# Patient Record
Sex: FEMALE | Race: OTHER | HISPANIC OR LATINO
[De-identification: names, ages, dates, MRNs, and addresses within clinical notes are randomized per-mention and may not be internally consistent; named-entity substitution may affect disease eponyms.]

---

## 2021-11-09 ENCOUNTER — TRANSCRIPTION ENCOUNTER (OUTPATIENT)
Age: 21
End: 2021-11-09

## 2021-11-09 ENCOUNTER — OUTPATIENT (OUTPATIENT)
Dept: OUTPATIENT SERVICES | Facility: HOSPITAL | Age: 21
LOS: 1 days | End: 2021-11-09
Payer: COMMERCIAL

## 2021-11-09 VITALS
WEIGHT: 169.09 LBS | DIASTOLIC BLOOD PRESSURE: 78 MMHG | TEMPERATURE: 98 F | HEART RATE: 88 BPM | HEIGHT: 63 IN | OXYGEN SATURATION: 100 % | RESPIRATION RATE: 16 BRPM | SYSTOLIC BLOOD PRESSURE: 116 MMHG

## 2021-11-09 DIAGNOSIS — Z98.890 OTHER SPECIFIED POSTPROCEDURAL STATES: Chronic | ICD-10-CM

## 2021-11-09 DIAGNOSIS — O02.1 MISSED ABORTION: ICD-10-CM

## 2021-11-09 DIAGNOSIS — Z01.818 ENCOUNTER FOR OTHER PREPROCEDURAL EXAMINATION: ICD-10-CM

## 2021-11-09 DIAGNOSIS — O00.91 UNSPECIFIED ECTOPIC PREGNANCY WITH INTRAUTERINE PREGNANCY: ICD-10-CM

## 2021-11-09 LAB
HCG SERPL-ACNC: 5285 MIU/ML — HIGH
HCT VFR BLD CALC: 37.6 % — SIGNIFICANT CHANGE UP (ref 34.5–45)
HGB BLD-MCNC: 11.9 G/DL — SIGNIFICANT CHANGE UP (ref 11.5–15.5)
MCHC RBC-ENTMCNC: 27.4 PG — SIGNIFICANT CHANGE UP (ref 27–34)
MCHC RBC-ENTMCNC: 31.6 GM/DL — LOW (ref 32–36)
MCV RBC AUTO: 86.4 FL — SIGNIFICANT CHANGE UP (ref 80–100)
NRBC # BLD: 0 /100 WBCS — SIGNIFICANT CHANGE UP (ref 0–0)
PLATELET # BLD AUTO: 272 K/UL — SIGNIFICANT CHANGE UP (ref 150–400)
RBC # BLD: 4.35 M/UL — SIGNIFICANT CHANGE UP (ref 3.8–5.2)
RBC # FLD: 12.8 % — SIGNIFICANT CHANGE UP (ref 10.3–14.5)
SARS-COV-2 RNA SPEC QL NAA+PROBE: SIGNIFICANT CHANGE UP
WBC # BLD: 9.4 K/UL — SIGNIFICANT CHANGE UP (ref 3.8–10.5)
WBC # FLD AUTO: 9.4 K/UL — SIGNIFICANT CHANGE UP (ref 3.8–10.5)

## 2021-11-09 PROCEDURE — 36415 COLL VENOUS BLD VENIPUNCTURE: CPT

## 2021-11-09 PROCEDURE — 86850 RBC ANTIBODY SCREEN: CPT

## 2021-11-09 PROCEDURE — 86900 BLOOD TYPING SEROLOGIC ABO: CPT

## 2021-11-09 PROCEDURE — 84702 CHORIONIC GONADOTROPIN TEST: CPT

## 2021-11-09 PROCEDURE — 85027 COMPLETE CBC AUTOMATED: CPT

## 2021-11-09 PROCEDURE — 86901 BLOOD TYPING SEROLOGIC RH(D): CPT

## 2021-11-09 PROCEDURE — U0003: CPT

## 2021-11-09 PROCEDURE — G0463: CPT

## 2021-11-09 PROCEDURE — U0005: CPT

## 2021-11-09 NOTE — H&P PST ADULT - PROBLEM SELECTOR PLAN 1
scheduled for a D&C hysteroscopy - miss - F.S - possible diagnostic laparoscopy on 11/10 with Dr. Fernando

## 2021-11-09 NOTE — H&P PST ADULT - GENERAL COMMENTS
Had 1 vaccine dose; Denies recent international travel, recent illness and sick contact with COVID in the last 3 weeks

## 2021-11-09 NOTE — H&P PST ADULT - HISTORY OF PRESENT ILLNESS
22 yo female presents to PST scheduled for a D&C hysteroscopy - miss - F.S - possible diagnostic laparoscopy on 11/10 with Dr. Fernando.  2021. Going for weekly ultrasounds to check for progression of pregnancy, no embryo was noted in the uterus, on the last ultrasound  Reports that she had cramping yesterday. Denies bleeding.  20 yo  female presents to PST scheduled for a D&C hysteroscopy - miss - F.S - possible diagnostic laparoscopy on 11/10 with Dr. Fernando.  2021. Going for weekly ultrasounds to check for progression of pregnancy, no embryo was noted in the uterus, on the last ultrasound  Reports that she had cramping yesterday. Denies bleeding.

## 2021-11-09 NOTE — H&P PST ADULT - PROBLEM SELECTOR PLAN 2
Labs - CBC, HCG , T&S and COVID PCR pending.   No MC needed  Pre op and Hibiclens instructions reviewed and given to use today and tomorrow morning. Instructed to hold and/or avoid other NSAIDs and OTC supplements. Tylenol is ok. Verbalized understanding

## 2021-11-09 NOTE — H&P PST ADULT - NSICDXFAMILYHX_GEN_ALL_CORE_FT
FAMILY HISTORY:  Mother  Still living? Yes, Estimated age: Age Unknown  FH: HTN (hypertension), Age at diagnosis: Age Unknown

## 2021-11-10 ENCOUNTER — OUTPATIENT (OUTPATIENT)
Dept: OUTPATIENT SERVICES | Facility: HOSPITAL | Age: 21
LOS: 1 days | End: 2021-11-10
Payer: COMMERCIAL

## 2021-11-10 ENCOUNTER — RESULT REVIEW (OUTPATIENT)
Age: 21
End: 2021-11-10

## 2021-11-10 VITALS
HEIGHT: 63 IN | OXYGEN SATURATION: 99 % | TEMPERATURE: 98 F | WEIGHT: 169.09 LBS | RESPIRATION RATE: 18 BRPM | SYSTOLIC BLOOD PRESSURE: 127 MMHG | HEART RATE: 112 BPM | DIASTOLIC BLOOD PRESSURE: 67 MMHG

## 2021-11-10 VITALS
HEART RATE: 101 BPM | OXYGEN SATURATION: 98 % | RESPIRATION RATE: 16 BRPM | DIASTOLIC BLOOD PRESSURE: 69 MMHG | SYSTOLIC BLOOD PRESSURE: 111 MMHG

## 2021-11-10 DIAGNOSIS — O02.1 MISSED ABORTION: ICD-10-CM

## 2021-11-10 DIAGNOSIS — O00.91 UNSPECIFIED ECTOPIC PREGNANCY WITH INTRAUTERINE PREGNANCY: ICD-10-CM

## 2021-11-10 DIAGNOSIS — Z98.890 OTHER SPECIFIED POSTPROCEDURAL STATES: Chronic | ICD-10-CM

## 2021-11-10 PROCEDURE — 88331 PATH CONSLTJ SURG 1 BLK 1SPC: CPT

## 2021-11-10 PROCEDURE — 88331 PATH CONSLTJ SURG 1 BLK 1SPC: CPT | Mod: 26

## 2021-11-10 PROCEDURE — 88305 TISSUE EXAM BY PATHOLOGIST: CPT

## 2021-11-10 PROCEDURE — 88305 TISSUE EXAM BY PATHOLOGIST: CPT | Mod: 26

## 2021-11-10 PROCEDURE — 59812 TREATMENT OF MISCARRIAGE: CPT

## 2021-11-10 RX ORDER — SODIUM CHLORIDE 9 MG/ML
1000 INJECTION, SOLUTION INTRAVENOUS
Refills: 0 | Status: DISCONTINUED | OUTPATIENT
Start: 2021-11-10 | End: 2021-11-10

## 2021-11-10 RX ORDER — OXYCODONE HYDROCHLORIDE 5 MG/1
5 TABLET ORAL ONCE
Refills: 0 | Status: DISCONTINUED | OUTPATIENT
Start: 2021-11-10 | End: 2021-11-10

## 2021-11-10 RX ORDER — ONDANSETRON 8 MG/1
4 TABLET, FILM COATED ORAL ONCE
Refills: 0 | Status: DISCONTINUED | OUTPATIENT
Start: 2021-11-10 | End: 2021-11-10

## 2021-11-10 RX ORDER — HYDROMORPHONE HYDROCHLORIDE 2 MG/ML
0.5 INJECTION INTRAMUSCULAR; INTRAVENOUS; SUBCUTANEOUS
Refills: 0 | Status: DISCONTINUED | OUTPATIENT
Start: 2021-11-10 | End: 2021-11-10

## 2021-11-10 RX ADMIN — SODIUM CHLORIDE 75 MILLILITER(S): 9 INJECTION, SOLUTION INTRAVENOUS at 19:47

## 2021-11-10 NOTE — ASU DISCHARGE PLAN (ADULT/PEDIATRIC) - CARE PROVIDER_API CALL
Queta Vargas)  Obstetrics and Gynecology  [address] [place] [address]  Phone: [phone]  Fax: [phone]  Follow Up Time:

## 2021-11-10 NOTE — BRIEF OPERATIVE NOTE - OPERATION/FINDINGS
Normal external genitalia, normal appearing cervix, os closed, moderate tissue obtained with suction and sharp curettage, methergine x1 given  EBL 10cc

## 2021-11-10 NOTE — BRIEF OPERATIVE NOTE - NSICDXBRIEFPROCEDURE_GEN_ALL_CORE_FT
PROCEDURES:  Dilation and curettage, uterus, after spontaneous  10-Nov-2021 18:54:22  Queta Fernando

## 2022-10-05 PROBLEM — Z78.9 OTHER SPECIFIED HEALTH STATUS: Chronic | Status: ACTIVE | Noted: 2021-11-09

## 2022-10-20 NOTE — H&P PST ADULT - NSANTHOSAYNRD_GEN_A_CORE
Chart(s)/Patient/Other Family Member No. CHERELLE screening performed.  STOP BANG Legend: 0-2 = LOW Risk; 3-4 = INTERMEDIATE Risk; 5-8 = HIGH Risk

## 2022-11-04 ENCOUNTER — APPOINTMENT (OUTPATIENT)
Dept: ANTEPARTUM | Facility: CLINIC | Age: 22
End: 2022-11-04

## 2022-11-04 ENCOUNTER — ASOB RESULT (OUTPATIENT)
Age: 22
End: 2022-11-04

## 2022-11-04 PROCEDURE — 76813 OB US NUCHAL MEAS 1 GEST: CPT

## 2022-11-04 PROCEDURE — 76801 OB US < 14 WKS SINGLE FETUS: CPT | Mod: 59

## 2022-12-30 ENCOUNTER — APPOINTMENT (OUTPATIENT)
Dept: ANTEPARTUM | Facility: CLINIC | Age: 22
End: 2022-12-30
Payer: COMMERCIAL

## 2022-12-30 ENCOUNTER — TRANSCRIPTION ENCOUNTER (OUTPATIENT)
Age: 22
End: 2022-12-30

## 2022-12-30 ENCOUNTER — ASOB RESULT (OUTPATIENT)
Age: 22
End: 2022-12-30

## 2022-12-30 PROCEDURE — 99212 OFFICE O/P EST SF 10 MIN: CPT | Mod: 25

## 2022-12-30 PROCEDURE — 76811 OB US DETAILED SNGL FETUS: CPT

## 2023-01-12 ENCOUNTER — ASOB RESULT (OUTPATIENT)
Age: 23
End: 2023-01-12

## 2023-01-12 ENCOUNTER — APPOINTMENT (OUTPATIENT)
Dept: ANTEPARTUM | Facility: CLINIC | Age: 23
End: 2023-01-12
Payer: COMMERCIAL

## 2023-01-12 PROCEDURE — 76816 OB US FOLLOW-UP PER FETUS: CPT

## 2023-02-17 ENCOUNTER — ASOB RESULT (OUTPATIENT)
Age: 23
End: 2023-02-17

## 2023-02-17 ENCOUNTER — APPOINTMENT (OUTPATIENT)
Dept: ANTEPARTUM | Facility: CLINIC | Age: 23
End: 2023-02-17
Payer: COMMERCIAL

## 2023-02-17 PROCEDURE — 76816 OB US FOLLOW-UP PER FETUS: CPT

## 2023-04-04 ENCOUNTER — OUTPATIENT (OUTPATIENT)
Dept: INPATIENT UNIT | Facility: HOSPITAL | Age: 23
LOS: 1 days | Discharge: HOME | End: 2023-04-04
Payer: MEDICAID

## 2023-04-04 VITALS
TEMPERATURE: 99 F | DIASTOLIC BLOOD PRESSURE: 58 MMHG | RESPIRATION RATE: 16 BRPM | HEART RATE: 89 BPM | SYSTOLIC BLOOD PRESSURE: 132 MMHG

## 2023-04-04 DIAGNOSIS — O26.899 OTHER SPECIFIED PREGNANCY RELATED CONDITIONS, UNSPECIFIED TRIMESTER: ICD-10-CM

## 2023-04-04 DIAGNOSIS — Z98.890 OTHER SPECIFIED POSTPROCEDURAL STATES: Chronic | ICD-10-CM

## 2023-04-04 LAB
APPEARANCE UR: ABNORMAL
BACTERIA # UR AUTO: ABNORMAL
BILIRUB UR-MCNC: NEGATIVE — SIGNIFICANT CHANGE UP
COLOR SPEC: SIGNIFICANT CHANGE UP
DIFF PNL FLD: ABNORMAL
EPI CELLS # UR: 6 /HPF — HIGH (ref 0–5)
GLUCOSE UR QL: NEGATIVE — SIGNIFICANT CHANGE UP
HYALINE CASTS # UR AUTO: 5 /LPF — SIGNIFICANT CHANGE UP (ref 0–7)
KETONES UR-MCNC: ABNORMAL
LEUKOCYTE ESTERASE UR-ACNC: ABNORMAL
NITRITE UR-MCNC: NEGATIVE — SIGNIFICANT CHANGE UP
PH UR: 6.5 — SIGNIFICANT CHANGE UP (ref 5–8)
PROT UR-MCNC: NEGATIVE — SIGNIFICANT CHANGE UP
RBC CASTS # UR COMP ASSIST: 2 /HPF — SIGNIFICANT CHANGE UP (ref 0–4)
SP GR SPEC: 1 — LOW (ref 1.01–1.05)
UROBILINOGEN FLD QL: SIGNIFICANT CHANGE UP
WBC UR QL: 23 /HPF — HIGH (ref 0–5)

## 2023-04-04 PROCEDURE — 99221 1ST HOSP IP/OBS SF/LOW 40: CPT

## 2023-04-04 RX ORDER — SODIUM CHLORIDE 9 MG/ML
1000 INJECTION, SOLUTION INTRAVENOUS ONCE
Refills: 0 | Status: COMPLETED | OUTPATIENT
Start: 2023-04-04 | End: 2023-04-04

## 2023-04-04 RX ORDER — CEPHALEXIN 500 MG
1 CAPSULE ORAL
Qty: 28 | Refills: 0
Start: 2023-04-04 | End: 2023-04-10

## 2023-04-04 RX ORDER — ACETAMINOPHEN 500 MG
1000 TABLET ORAL ONCE
Refills: 0 | Status: COMPLETED | OUTPATIENT
Start: 2023-04-04 | End: 2023-04-04

## 2023-04-04 RX ADMIN — Medication 1000 MILLIGRAM(S): at 22:25

## 2023-04-04 RX ADMIN — Medication 1000 MILLIGRAM(S): at 22:30

## 2023-04-04 RX ADMIN — SODIUM CHLORIDE 1000 MILLILITER(S): 9 INJECTION, SOLUTION INTRAVENOUS at 23:18

## 2023-04-04 NOTE — OB PROVIDER TRIAGE NOTE - NSHPLABSRESULTS_GEN_ALL_CORE
Urinalysis Basic - ( 2023 22:25 )    Color: Light Yellow / Appearance: Slightly Turbid / S.005 / pH: x  Gluc: x / Ketone: Trace  / Bili: Negative / Urobili: <2 mg/dL   Blood: x / Protein: Negative / Nitrite: Negative   Leuk Esterase: Large / RBC: 2 /HPF / WBC 23 /HPF   Sq Epi: x / Non Sq Epi: 6 /HPF / Bacteria: Few

## 2023-04-04 NOTE — OB PROVIDER TRIAGE NOTE - PLAN OF CARE
22 y/o , EDC , [place] 35 weeks, evidence of UTI.  - Keflex 500 Q6hrs for 7 days sent to pharmacy, urine culture ordered    23:00-pt reports pain did not improve with tylenol, 1 L LR bolus ordered, for recheck at 00:15     00:20- pt states pain improved after bolus, repeat VE unchanged    - Patient to be discharged home with follow up and return precautions  - Please follow up with your obstetrician at your next scheduled appointment.   - Please return for decreased / no fetal movement, vaginal bleeding similar to that of a period, leaking / gush of fluid, regular contractions occurring 4-5 minutes  for one hour or requiring pain medication   - Patient educated of plan and demonstrate understanding. All questions answered. Discharge instructions provided and signed.     Plan d/w Dr. Fernando

## 2023-04-04 NOTE — OB PROVIDER TRIAGE NOTE - ADDITIONAL INSTRUCTIONS
24 y/o , EDC , [place] 35 weeks, evidence of UTI.  - Keflex 500 Q6hrs for 7 days sent to pharmacy, urine culture ordered    23:00-pt reports pain did not improve with tylenol, 1 L LR bolus ordered, for recheck at 00:15     00:20- pt states pain improved after bolus, repeat VE unchanged    - Patient to be discharged home with follow up and return precautions  - Please follow up with your obstetrician at your next scheduled appointment.   - Please return for decreased / no fetal movement, vaginal bleeding similar to that of a period, leaking / gush of fluid, regular contractions occurring 4-5 minutes  for one hour or requiring pain medication   - Patient educated of plan and demonstrate understanding. All questions answered. Discharge instructions provided and signed.     Plan d/w Dr. Fernando

## 2023-04-04 NOTE — OB RN TRIAGE NOTE - CHIEF COMPLAINT QUOTE
"My doctor send me to get checked since I have lower abdominal pain , cramps every 10 minutes and was leaking clear fluid from Sunday and now it's getting brownish"

## 2023-04-04 NOTE — OB PROVIDER TRIAGE NOTE - NSOBPROVIDERNOTE_OBGYN_ALL_OB_FT
24 y/o , EDC , [place] 35 weeks 24 y/o , EDC , [place] 35 weeks, evidence of UTI.  - Keflex 500 Q6hrs for 7 days sent to pharmacy, urine culture ordered    23:00-pt reports pain did not improve with tylenol, 1 L LR bolus ordered, for recheck at 00:15 22 y/o , EDC , [place] 35 weeks, evidence of UTI.  - Keflex 500 Q6hrs for 7 days sent to pharmacy, urine culture ordered    23:00-pt reports pain did not improve with tylenol, 1 L LR bolus ordered, for recheck at 00:15     00:20- pt states pain improved after bolus, repeat VE unchanged    - Patient to be discharged home with follow up and return precautions  - Please follow up with your obstetrician at your next scheduled appointment.   - Please return for decreased / no fetal movement, vaginal bleeding similar to that of a period, leaking / gush of fluid, regular contractions occurring 4-5 minutes  for one hour or requiring pain medication   - Patient educated of plan and demonstrate understanding. All questions answered. Discharge instructions provided and signed.     Plan d/w Dr. Fernando 22 y/o , EDC , [place] 35 weeks, evidence of UTI.  - Keflex 500 Q6hrs for 7 days sent to pharmacy, urine culture ordered    23:00-pt reports pain did not improve with tylenol, 1 L LR bolus ordered, for recheck at 00:15     00:20- pt states pain improved after bolus, repeat VE unchanged    - Patient to be discharged home with follow up and return precautions  - Please follow up with your obstetrician at your next scheduled appointment.   - Please return for decreased / no fetal movement, vaginal bleeding similar to that of a period, leaking / gush of fluid, regular contractions occurring 4-5 minutes  for one hour or requiring pain medication   - Patient educated of plan and demonstrate understanding. All questions answered. Discharge instructions provided and signed.     Plan d/w Dr. Fernando    Discharge Time:  00:35

## 2023-04-04 NOTE — OB PROVIDER TRIAGE NOTE - NSHPPHYSICALEXAM_GEN_ALL_CORE
Vital Signs Last 24 Hrs  T(C): 36.6 (04 Apr 2023 21:30), Max: 37.1 (04 Apr 2023 20:19)  T(F): 97.88 (04 Apr 2023 21:30), Max: 98.8 (04 Apr 2023 20:19)  HR: 85 (04 Apr 2023 21:31) (85 - 89)  BP: 119/70 (04 Apr 2023 21:31) (119/70 - 132/58)  BP(mean): --  RR: 16 (04 Apr 2023 20:19) (16 - 16)  SpO2: --    SSE: os appears open, brownish stained leukorrhea, slight vaginal spotting  TAS:   FHR: 150 baseline, moderate variability, with accelerations, no decelerations  CTX: irregular  SVE: 2/30/-2 Vital Signs Last 24 Hrs  T(C): 36.6 (04 Apr 2023 21:30), Max: 37.1 (04 Apr 2023 20:19)  T(F): 97.88 (04 Apr 2023 21:30), Max: 98.8 (04 Apr 2023 20:19)  HR: 85 (04 Apr 2023 21:31) (85 - 89)  BP: 119/70 (04 Apr 2023 21:31) (119/70 - 132/58)  BP(mean): --  RR: 16 (04 Apr 2023 20:19) (16 - 16)  SpO2: --    SSE: os appears open, brownish stained leukorrhea, slight vaginal spotting  TAS: cephalic, posterior placenta, [place] 13.25,  bpm, BPP 8/8  FHR: 150 baseline, moderate variability, with accelerations, no decelerations  CTX: irregular  SVE: 2/30/-2

## 2023-04-04 NOTE — OB RN TRIAGE NOTE - FALL HARM RISK - UNIVERSAL INTERVENTIONS
Bed in lowest position, wheels locked, appropriate side rails in place/Call bell, personal items and telephone in reach/Instruct patient to call for assistance before getting out of bed or chair/Non-slip footwear when patient is out of bed/[place] to call system/Physically safe environment - no spills, clutter or unnecessary equipment/Purposeful Proactive Rounding/Room/bathroom lighting operational, light cord in reach

## 2023-04-04 NOTE — OB PROVIDER TRIAGE NOTE - HISTORY OF PRESENT ILLNESS
22 y/o , EDC , [place] 35 weeks, presents for lower abdominal and lower back cramping pain since Saturday, intermittent, now every 10 minutes, 6/10 on numeric pain scale, with nausea. Pt reports she took tylenol with minimal relief, requests tylenol now. Also reports red/ brown staining on , brown vaginal discharge today, and increased frequency of urination and dysuria today. Denies hematuria, vomiting, diarrhea, constipation, fever, sick contacts. Denies LOF. Reports good FM.  Pt had regular OB visit today and was sent in by her Dr to be evaluated for PTL. She was 2 cm dilated in office today.    Antepartum History: Placenta Previa, resolved  Covid Status: Vaccinated x2, had Covid   OBGYN History:  -  with D&C  -denies history of fibroids, ovarian cysts, abnormal paps, STD's, herpes  Medical History: Denies  Surgical History: D&C x1  Allergies: NKDA  Medications: PNV  Mental Health History: Denies  Alcohol/Drug/Tobacco Use: Denies

## 2023-04-05 VITALS — SYSTOLIC BLOOD PRESSURE: 115 MMHG | HEART RATE: 82 BPM | DIASTOLIC BLOOD PRESSURE: 65 MMHG

## 2023-04-06 DIAGNOSIS — O09.293 SUPERVISION OF PREGNANCY WITH OTHER POOR REPRODUCTIVE OR OBSTETRIC HISTORY, THIRD TRIMESTER: ICD-10-CM

## 2023-04-06 DIAGNOSIS — O23.43 UNSPECIFIED INFECTION OF URINARY TRACT IN PREGNANCY, THIRD TRIMESTER: ICD-10-CM

## 2023-04-06 DIAGNOSIS — N89.8 OTHER SPECIFIED NONINFLAMMATORY DISORDERS OF VAGINA: ICD-10-CM

## 2023-04-06 DIAGNOSIS — O99.891 OTHER SPECIFIED DISEASES AND CONDITIONS COMPLICATING PREGNANCY: ICD-10-CM

## 2023-04-06 DIAGNOSIS — Z3A.35 35 WEEKS GESTATION OF PREGNANCY: ICD-10-CM

## 2023-04-06 DIAGNOSIS — Z86.16 PERSONAL HISTORY OF COVID-19: ICD-10-CM

## 2023-04-06 LAB
CULTURE RESULTS: SIGNIFICANT CHANGE UP
SPECIMEN SOURCE: SIGNIFICANT CHANGE UP

## 2023-04-21 ENCOUNTER — APPOINTMENT (OUTPATIENT)
Dept: ANTEPARTUM | Facility: CLINIC | Age: 23
End: 2023-04-21
Payer: MEDICAID

## 2023-04-21 ENCOUNTER — NON-APPOINTMENT (OUTPATIENT)
Age: 23
End: 2023-04-21

## 2023-04-21 ENCOUNTER — ASOB RESULT (OUTPATIENT)
Age: 23
End: 2023-04-21

## 2023-04-21 PROCEDURE — 76816 OB US FOLLOW-UP PER FETUS: CPT

## 2023-04-21 PROCEDURE — 76819 FETAL BIOPHYS PROFIL W/O NST: CPT

## 2023-04-21 PROCEDURE — 99212 OFFICE O/P EST SF 10 MIN: CPT | Mod: 25

## 2023-10-12 NOTE — ASU PREOP CHECKLIST - WAS PATIENT ON BETA BLOCKER?
"Chief Complaint   Patient presents with    RECHECK     Post op      Blood pressure 121/75, pulse 110, temperature 96.8  F (36  C), height 1.854 m (6' 1\"), weight 95.7 kg (211 lb), SpO2 96%.  James Groves LPN    "
No

## 2024-07-12 ENCOUNTER — APPOINTMENT (OUTPATIENT)
Dept: ANTEPARTUM | Facility: CLINIC | Age: 24
End: 2024-07-12

## 2024-07-12 ENCOUNTER — ASOB RESULT (OUTPATIENT)
Age: 24
End: 2024-07-12

## 2024-07-12 PROCEDURE — 76813 OB US NUCHAL MEAS 1 GEST: CPT

## 2024-07-12 PROCEDURE — 76801 OB US < 14 WKS SINGLE FETUS: CPT | Mod: 59

## 2024-08-30 ENCOUNTER — ASOB RESULT (OUTPATIENT)
Age: 24
End: 2024-08-30

## 2024-08-30 ENCOUNTER — APPOINTMENT (OUTPATIENT)
Dept: ANTEPARTUM | Facility: CLINIC | Age: 24
End: 2024-08-30

## 2024-08-30 PROCEDURE — 76805 OB US >/= 14 WKS SNGL FETUS: CPT
